# Patient Record
Sex: MALE | Race: WHITE
[De-identification: names, ages, dates, MRNs, and addresses within clinical notes are randomized per-mention and may not be internally consistent; named-entity substitution may affect disease eponyms.]

---

## 2021-03-11 ENCOUNTER — HOSPITAL ENCOUNTER (EMERGENCY)
Dept: HOSPITAL 56 - MW.ED | Age: 71
Discharge: NURSING, REHAB OR LONG-TERM CARE | End: 2021-03-11
Payer: MEDICARE

## 2021-03-11 DIAGNOSIS — Z79.82: ICD-10-CM

## 2021-03-11 DIAGNOSIS — K35.33: ICD-10-CM

## 2021-03-11 DIAGNOSIS — Z88.1: ICD-10-CM

## 2021-03-11 DIAGNOSIS — Z20.822: ICD-10-CM

## 2021-03-11 DIAGNOSIS — Z79.84: ICD-10-CM

## 2021-03-11 DIAGNOSIS — R33.9: ICD-10-CM

## 2021-03-11 DIAGNOSIS — Z88.6: ICD-10-CM

## 2021-03-11 DIAGNOSIS — Z88.8: ICD-10-CM

## 2021-03-11 DIAGNOSIS — Z79.899: ICD-10-CM

## 2021-03-11 DIAGNOSIS — A41.9: Primary | ICD-10-CM

## 2021-03-11 LAB
BUN SERPL-MCNC: 24 MG/DL (ref 7–18)
CHLORIDE SERPL-SCNC: 97 MMOL/L (ref 98–107)
CO2 SERPL-SCNC: 24.2 MMOL/L (ref 21–32)
GLUCOSE SERPL-MCNC: 186 MG/DL (ref 74–106)
LIPASE SERPL-CCNC: 106 U/L (ref 73–393)
POTASSIUM SERPL-SCNC: 4 MMOL/L (ref 3.5–5.1)
SODIUM SERPL-SCNC: 131 MMOL/L (ref 136–148)

## 2021-03-11 PROCEDURE — 93005 ELECTROCARDIOGRAM TRACING: CPT

## 2021-03-11 PROCEDURE — 87040 BLOOD CULTURE FOR BACTERIA: CPT

## 2021-03-11 PROCEDURE — 81001 URINALYSIS AUTO W/SCOPE: CPT

## 2021-03-11 PROCEDURE — 74176 CT ABD & PELVIS W/O CONTRAST: CPT

## 2021-03-11 PROCEDURE — 51702 INSERT TEMP BLADDER CATH: CPT

## 2021-03-11 PROCEDURE — 80053 COMPREHEN METABOLIC PANEL: CPT

## 2021-03-11 PROCEDURE — 99285 EMERGENCY DEPT VISIT HI MDM: CPT

## 2021-03-11 PROCEDURE — 83690 ASSAY OF LIPASE: CPT

## 2021-03-11 PROCEDURE — 85025 COMPLETE CBC W/AUTO DIFF WBC: CPT

## 2021-03-11 PROCEDURE — 71045 X-RAY EXAM CHEST 1 VIEW: CPT

## 2021-03-11 PROCEDURE — 96365 THER/PROPH/DIAG IV INF INIT: CPT

## 2021-03-11 PROCEDURE — 36415 COLL VENOUS BLD VENIPUNCTURE: CPT

## 2021-03-11 PROCEDURE — 74177 CT ABD & PELVIS W/CONTRAST: CPT

## 2021-03-11 PROCEDURE — U0002 COVID-19 LAB TEST NON-CDC: HCPCS

## 2021-03-11 PROCEDURE — 83605 ASSAY OF LACTIC ACID: CPT

## 2021-03-11 NOTE — CR
HISTORY:



Fever and elevated white count. 



COMPARISON:



None available 



FINDINGS:



A portable erect AP view of the chest was obtained at 1453 hours. 



The lungs are clear. No focal or diffuse infiltrates are present.



The heart is normal in size. 



The mediastinum is normal in appearance. 



There is minimal scoliosis of the thoracic spine convex towards the right.



IMPRESSION:



No active disease seen in the chest.



Dictated by Fady Hathaway MD @ Mar 11 2021  3:24PM



Signed by Dr. Fady Hathaway @ Mar 11 2021  3:25PM
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
## 2021-03-11 NOTE — CT
INDICATION:



Possible abscess on unenhanced CT scan from today. 



COMPARISON:



Noncontrast CT of the abdomen and pelvis from earlier today. 



TECHNIQUE:



CT examination of the abdomen and pelvis was performed with the uneventful 

intravenous administration of 100 cc of Isovue 370 while 3 mm thick axial 

sections were obtained from the lung bases through the pubic symphysis. 

Oral contrast was administered. 



Please note that all CT scans at this facility use dose modulation, 

iterative reconstruction, and/or weight-based dosing when appropriate to 

reduce radiation dose to as low as reasonably achievable. 



FINDINGS:



In the abdomen, the liver, spleen, pancreas, and right adrenal are normal 

in appearance. 



There is a low-density 3.0 x 2.8 centimeter left adrenal mass with density 

of 3 Hounsfield units on the noncontrast study, findings of a lipid rich 

adrenal adenoma, requiring no further follow-up. 



There are several parapelvic cysts and cortical cysts in the lower pole of 

the left kidney, of no clinical concern. 



There is a 8 millimeter exophytic structure projecting posteriorly from the 

interpolar left kidney with high density of 72 Hounsfield units on the 

precontrast examination, with no enhancement on the postcontrast 

examination, findings of a high-density cyst, requiring no further 

follow-up. 



A few small cortical cysts are seen in the lower interpolar right kidney. 



The gallbladder is normal in appearance. 



The abdominal aorta is normal in caliber with no sign of dilatation. There 

is no sign of retroperitoneal mass or adenopathy. 



The stomach, loops of small bowel, and colon in the abdomen are normal in 

appearance. 



In the pelvis, there is a large abscess located in the right pelvis 

adjacent to the inflamed midportion of the sigmoid colon and extending 

superiorly to reach the area of the cecum. This abscess measures 8.2 x 6.2 

x 3.9 centimeters. It extends superiorly to reach a tubular, fluid-filled 

structure which appears to arise from the cecum, probably a prominently 

distended appendix, measuring up to 15 millimeters in diameter. The 

findings are that of a periappendiceal abscess. 



There is prominent inflammation of the wall of the loop of mid small bowel 

which passes adjacent to the abscess, best seen on axial image 119 series 

201, but there is no sign of any extravasation or collection of oral 

contrast in this region. I believe that this is secondary inflammation of 

the small bowel, but I cannot exclude a small bowel malignancy with 

ulceration and perforation as a source of the abscess.



Similarly, there is mild mucosal thickening of the mid sigmoid colon where 

there is prominent diverticulosis. I do not believe that the findings are 

that of diverticulitis with a diverticular abscess, with little pericolonic 

inflammatory stranding. 



The distal loops of small bowel in the pelvis are normal in appearance. As 

mentioned above, there is prominent diverticulosis of the sigmoid colon 

without definite evidence of diverticulitis. The rectum is normal in 

appearance. 



The prostate is mildly enlarged and is otherwise normal in appearance. 



The urinary bladder is now seen to have a Rausch catheter in satisfactory 

position and is largely decompressed. The bladder wall is normal in 

appearance. 



There is no sign of pelvic or inguinal mass or adenopathy.



There is no sign of free air or free fluid in the abdomen or pelvis. 



The lung bases are clear.



Again seen is heavy triple-vessel coronary calcification. The inferior 

portion of the heart that is seen is otherwise normal in size and 

appearance. 



There is a lumbarized S1 segment with a well-defined, hypoplastic S1-S2 

disc space.



There is moderate disc degenerative disease from L3 through S1 with vacuum 

disc degeneration at L3-4 and L5-S1. There is mild disc degenerative 

disease at L2-3.



There is mild anterior wedging of the T12 vertebral body, probably a mild 

compression fracture of indeterminate age. 



I discussed the findings with Dr. Mobley at 1749 hours on 03/11/2021. 



IMPRESSION:



CT of the abdomen shows several cortical and parapelvic cysts on the left, 

including an 8 millimeter diameter high-density cortical cyst requiring no 

further follow-up. 



3.0 centimeter diameter left adrenal nodule with findings representative of 

a lipid rich adrenal adenoma requiring no further follow-up.



CT of the pelvis shows a large complex abscess in the right mid and upper 

pelvis which appears to arise from a dilated, inflamed appendix measuring a 

1.5 centimeters in diameter. 



However, there is prominent inflammation of a segment of the distal jejunum 

located superior to the abscess, with prominent wall thickening. While it 

is most likely that the small bowel is secondarily inflamed by the abscess, 

a small-bowel malignancy with ulceration and perforation could also result 

in this abscess. 



There is prominent sigmoid diverticulosis with mild mucosal thickening 

throughout the majority of the sigmoid colon. The findings are probably 

secondary inflammation of the sigmoid colon from the right pelvic abscess, 

with no prominent pericolonic inflammation to suggest diverticulitis. 



Tiny amount of free fluid in the pelvis. No sign of any free air or 

extraluminal air in the abdomen or pelvis.



Please note that all CT scans at this facility use dose modulation, 

iterative reconstruction, and/or weight-based dosing when appropriate to 

reduce radiation dose to as low as reasonably achievable.



Dictated by Fady Hathaway MD @ Mar 11 2021  5:37PM



Signed by Dr. Fady Hathaway @ Mar 11 2021  6:06PM

## 2021-09-10 NOTE — PCM.POSTAN
POST ANESTHESIA ASSESSMENT





- MENTAL STATUS


Mental Status: Alert, Oriented





- VITAL SIGNS


Vital Signs: 


                                Last Vital Signs











Temp  98.8 F   09/10/21 07:57


 


Pulse  83   09/10/21 08:58


 


Resp  14   09/10/21 08:58


 


BP  134/68   09/10/21 08:58


 


Pulse Ox  92 L  09/10/21 08:58














- RESPIRATORY


Respiratory Status: Respiratory Rate WNL, Airway Patent, O2 Saturation Stable





- CARDIOVASCULAR


CV Status: Pulse Rate WNL, Blood Pressure Stable





- GASTROINTESTINAL


GI Status: No Symptoms





- PAIN


Pain Score: 0





- POST OP HYDRATION


Hydration Status: Adequate & Stable

## 2021-09-10 NOTE — OR
SURGEON:

Rodolfo Gilliam M.D.

 

DATE OF PROCEDURE:  09/10/2021

 

OPERATION PERFORMED:

Colonoscopy with cold cecal polypectomy.

 

PRIMARY SURGEON:

Rodolfo Gilliam M.D.

 

ASSISTANT:

First assistant:  Tracey Adrian NP student.

 

ANESTHESIA:

MAC.

 

ASA CLASSIFICATION:

III.

 

PREOPERATIVE DIAGNOSES:

1. Change in bowel habits post appendectomy.

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

 

POSTOPERATIVE DIAGNOSES:

1. Small cecal polyp.

2. Sigmoid diverticulosis.

 

DESCRIPTION OF PROCEDURE:

The patient was taken to the endoscopy room and positioned on the endoscopy

table in the left lateral decubitus position.  Time-out was called for

appropriate identification of the patient and procedure.  Monitored anesthesia

care was provided.  The colonoscope was inserted into the rectum and advanced

with moderate difficulty to the cecum.  The cecum was identified by internal

landmarks and external pressure.  The appendiceal orifice was visualized.  It

did not show any acute abnormality.  One small polyp was encountered adjacent to

the appendiceal orifice, and this was removed with cold biopsy forceps.  The

colonoscope was retroflexed to visualize the ascending colon from below.  No

tumors or polyps were seen on retroflexed view.  The colonoscope was then

straightened and slowly withdrawn.  The ascending colon, hepatic flexure,

transverse colon, splenic flexure, and descending colon showed no tumors,

polyps, diverticula, or angiodysplastic changes.  Sigmoid colon demonstrated

moderate diverticular change.  No stricture, spasm, or bleeding were noted.  The

colonoscope was withdrawn to the rectum and retroflexed to visualize the anal

orifice from above.  No tumors or polyps were seen and there were no acute

hemorrhoidal changes.  The colonoscope was then straightened, the rectum

aspirated, and the colonoscope removed.

 

The patient tolerated the procedure well and was taken to recovery room in

stable condition.

 

 

CORI / MARILYN

DD:  09/10/2021 08:51:37

DT:  09/10/2021 11:33:04

Job #:  [id number]

## 2021-09-10 NOTE — [id number]
Post Anesthesia Note





- EVALUATION WITHIN 48HRS OF ANESTHETIC


Vital Signs in Normal Range: Yes


Patient Participated in Evaluation: Yes


Respiratory Function Stable: Yes


Airway Patent: Yes


Cardiovascular Function Stable: Yes


Hydration Status Stable: Yes


Pain Control Satisfactory: Yes


Nausea and Vomiting Control Satisfactory: Yes


Mental Status Recovered: Yes


Vital Signs: 


                                Last Vital Signs











Temp  98.8 F   09/10/21 07:57


 


Pulse  83   09/10/21 08:58


 


Resp  14   09/10/21 08:58


 


BP  134/68   09/10/21 08:58


 


Pulse Ox  92 L  09/10/21 08:58














- COMMENTS/OBSERVATIONS


Free Text/Narrative:: 





Pt doing well post-op. VSS. No apparent anesthetic complications.





Dr. Jeremiah Monroy

## 2021-09-10 NOTE — PCM.PREANE
Preanesthetic Assessment





- Procedure


Proposed Procedure: 





Colonoscopy





- Anesthesia/Transfusion/Family Hx


Anesthesia History: Prior Anesthesia Without Reaction


Family History of Anesthesia Reaction: No


Transfusion History: Prior Transfusion Without Reaction





- Review of Systems


General: No Symptoms


Pulmonary: No Symptoms (Smokes 1/2 PPD)


Cardiovascular: No Symptoms (HTN)


Gastrointestinal: No Symptoms (GERD-Well controlled)


Neurological: No Symptoms


Other: Reports: Diabetes (NIDDM)





- Physical Assessment


NPO Status Date: 09/08/21


NPO Status Time: 19:00


Height: 5 ft 9 in


Weight: 99.79 kg


ASA Class: 3


Mental Status: Alert & Oriented x3


Airway Class: Mallampati = 4


Dentition: Reports: Missing Tooth/Teeth (1 broken tooth, lower right)


Thyro-Mental Finger Breadths: 3


Mouth Opening Finger Breadths: 3


ROM/Head Extension: Full


Lungs: Normal Respiratory Effort, Rales (Bilateral Rales)


Cardiovascular: Regular Rate, Regular Rhythm





- Allergies


Allergies/Adverse Reactions: 


                                    Allergies











Allergy/AdvReac Type Severity Reaction Status Date / Time


 


celecoxib [From Celebrex] Allergy  Diarrhea Verified 09/07/21 14:59


 


clarithromycin [From Biaxin] Allergy  Diarrhea Verified 09/07/21 14:59


 


ibuprofen [From Motrin] Allergy  Diarrhea Verified 09/07/21 14:59


 


rofecoxib [From Vioxx] Allergy  Diarrhea Verified 09/07/21 14:59


 


valdecoxib [From Bextra] Allergy  Diarrhea Verified 09/07/21 14:59














- Anesthesia Plan


Beta Blocker: Metoprolol


Med Last Dose Date: 09/10/21


Med Last Dose Time: 06:30





- Acknowledgements


Anesthesia Type Planned: General Anesthesia


Pt an Appropriate Candidate for the Planned Anesthesia: Yes


Alternatives and Risks of Anesthesia Discussed w Pt/Guardian: Yes


Pt/Guardian Understands and Agrees with Anesthesia Plan: Yes





PreAnesthesia Questionnaire


HEENT History: Reports: Hard of Hearing, Other (See Below)


Other HEENT History: wears glasses, has one upper fromt dental implant, has one 

loose cap


Cardiovascular History: Reports: High Cholesterol, Hypertension


Respiratory History: Reports: None


Gastrointestinal History: Reports: GERD


Other Gastrointestinal History: Ulcers


Genitourinary History: Reports: BPH


Other Genitourinary History: UTI


Musculoskeletal History: Reports: Back Pain, Chronic, Osteoarthritis, Other (See

Below)


Other Musculoskeletal History: chronic right shoulder pain and hip pain,  hx of 

fx left arm and right middle finger


Neurological History: Reports: Headaches, Chronic


Psychiatric History: Reports: None


Endocrine/Metabolic History: Reports: Diabetes, Type II, Obesity/BMI 30+


Hematologic History: Reports: Other (See Below)


Other Hematologic History: may have had a blood transfusion during appendectomy


Immunologic History: Reports: None


Oncologic (Cancer) History: Reports: Other (See Below)


Other Oncologic History: skin cancer removed from left hand


Dermatologic History: Reports: Eczema





- Infectious Disease History


Infectious Disease History: Reports: Chicken Pox, Measles, Mumps





- Past Surgical History


Head Surgeries/Procedures: Reports: None


HEENT Surgical History: Reports: Cataract Surgery, Eye Surgery, Tonsillectomy


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 

2004


Cardiovascular Surgical History: Reports: None


Respiratory Surgical History: Reports: None


GI Surgical History: Reports: Appendectomy, EGD


Other GI Surgeries/Procedures: recent lap appy-.  with dx of mucinous neoplasm


Male  Surgical History: Reports: None


Endocrine Surgical History: Reports: None


Neurological Surgical History: Reports: None


Musculoskeletal Surgical History: Reports: Carpal Tunnel, Shoulder Surgery


Other Musculoskeletal Surgeries/Procedures:: "reconstruction" of right shoulder 

x2


Oncologic Surgical History: Reports: None





- SUBSTANCE USE


Tobacco Use Status *Q: Current Every Day Tobacco User


Tobacco Use Within Last Twelve Months: Cigarettes


Recreational Drug Use History: No





- HOME MEDS


Home Medications: 


                                    Home Meds





Cholecalciferol (Vitamin D3) [Vitamin D3] 50,000 unit PO DAILY 03/11/21 

[History]


Cranberry/B.coagulan/C/Calcium [Cranberry-Probiotic] 125 mg PO DAILY 03/11/21 

[History]


Doxazosin [Doxazosin Mesylate] 8 mg PO BEDTIME 03/11/21 [History]


Enalapril Maleate 20 mg PO BID 03/11/21 [History]


Metoprolol Tartrate 100 mg PO BID 03/11/21 [History]


Pantoprazole [ProTONIX***] 40 mg PO DAILY 03/11/21 [History]


Potassium Chloride [Klor-Con 10] 10 meq PO BID 03/11/21 [History]


Triamterene/Hydrochlorothiazid [Triamterene-HCTZ 37.5-25 MG] 1 tab PO DAILY 

03/11/21 [History]


Vit A/Vit C/Vit E/Zinc/Copper [Preservision Areds Softgel] 1 cap PO BID 03/11/21

[History]


atorvaSTATin [Lipitor] 10 mg PO BEDTIME 03/11/21 [History]


metFORMIN [Glucophage] 1,000 mg PO BIDMEALS 03/11/21 [History]


Aspirin 325 mg PO TID 09/07/21 [History]


prednisoLONE acetate [Pred Forte 1% Ophth Susp] 1 drop EYERT BEDTIME 09/07/21 

[History]











- CURRENT (IN HOUSE) MEDS


Current Meds: 





                               Current Medications





Lactated Ringer's (Ringers, Lactated)  1,000 mls @ 125 mls/hr IV ASDIRECTED MALDONADO





Discontinued Medications





Fentanyl (Fentanyl 100 Mcg/2 Ml Sdv) Confirm Administered Dose 100 mcg .ROUTE 

.STK-MED ONE


   Stop: 09/10/21 07:08


Propofol (Propofol 200 Mg/20 Ml Sdv) Confirm Administered Dose 400 mg .ROUTE 

.STK-MED ONE


   Stop: 09/10/21 07:08

## 2021-09-10 NOTE — PCM.OPNOTE
- General Post-Op/Procedure Note


Date of Surgery/Procedure: 09/10/21


Operative Procedure(s): Colonoscopy with cold cecal polypectomy


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  Change in 

bowel habits.


Post-Op Diagnosis: Cecal polyp.  Sigmoid diverticulosis.


Anesthesia Technique: MAC (ASA III)


Primary Surgeon: Rodolfo Gilliam


Assistant: Tracey Adrian


Condition: Good


Free Text/Narrative:: 


DICTATION [id number]





CPT CODE 46388